# Patient Record
Sex: MALE | Race: WHITE
[De-identification: names, ages, dates, MRNs, and addresses within clinical notes are randomized per-mention and may not be internally consistent; named-entity substitution may affect disease eponyms.]

---

## 2019-01-27 ENCOUNTER — HOSPITAL ENCOUNTER (EMERGENCY)
Dept: HOSPITAL 47 - EC | Age: 29
Discharge: ANOTHER HOSPITAL | End: 2019-01-27
Payer: COMMERCIAL

## 2019-01-27 VITALS — DIASTOLIC BLOOD PRESSURE: 64 MMHG | HEART RATE: 86 BPM | SYSTOLIC BLOOD PRESSURE: 117 MMHG | RESPIRATION RATE: 18 BRPM

## 2019-01-27 VITALS — TEMPERATURE: 97.8 F

## 2019-01-27 DIAGNOSIS — R56.9: Primary | ICD-10-CM

## 2019-01-27 DIAGNOSIS — F17.200: ICD-10-CM

## 2019-01-27 LAB
ALBUMIN SERPL-MCNC: 4.1 G/DL (ref 3.5–5)
ALP SERPL-CCNC: 86 U/L (ref 38–126)
ALT SERPL-CCNC: 31 U/L (ref 21–72)
ANION GAP SERPL CALC-SCNC: 9 MMOL/L
AST SERPL-CCNC: 35 U/L (ref 17–59)
BASOPHILS # BLD AUTO: 0 K/UL (ref 0–0.2)
BASOPHILS NFR BLD AUTO: 0 %
BUN SERPL-SCNC: 17 MG/DL (ref 9–20)
CALCIUM SPEC-MCNC: 9.2 MG/DL (ref 8.4–10.2)
CHLORIDE SERPL-SCNC: 111 MMOL/L (ref 98–107)
CO2 SERPL-SCNC: 19 MMOL/L (ref 22–30)
EOSINOPHIL # BLD AUTO: 0.1 K/UL (ref 0–0.7)
EOSINOPHIL NFR BLD AUTO: 2 %
ERYTHROCYTE [DISTWIDTH] IN BLOOD BY AUTOMATED COUNT: 4.29 M/UL (ref 4.3–5.9)
ERYTHROCYTE [DISTWIDTH] IN BLOOD: 13.1 % (ref 11.5–15.5)
GLUCOSE SERPL-MCNC: 104 MG/DL (ref 74–99)
HCT VFR BLD AUTO: 41.6 % (ref 39–53)
HGB BLD-MCNC: 13.7 GM/DL (ref 13–17.5)
LYMPHOCYTES # SPEC AUTO: 1.8 K/UL (ref 1–4.8)
LYMPHOCYTES NFR SPEC AUTO: 21 %
MCH RBC QN AUTO: 31.9 PG (ref 25–35)
MCHC RBC AUTO-ENTMCNC: 32.9 G/DL (ref 31–37)
MCV RBC AUTO: 97.1 FL (ref 80–100)
MONOCYTES # BLD AUTO: 0.4 K/UL (ref 0–1)
MONOCYTES NFR BLD AUTO: 5 %
NEUTROPHILS # BLD AUTO: 6.1 K/UL (ref 1.3–7.7)
NEUTROPHILS NFR BLD AUTO: 70 %
PH UR: 5.5 [PH] (ref 5–8)
PLATELET # BLD AUTO: 209 K/UL (ref 150–450)
POTASSIUM SERPL-SCNC: 4.7 MMOL/L (ref 3.5–5.1)
PROT SERPL-MCNC: 6.7 G/DL (ref 6.3–8.2)
SODIUM SERPL-SCNC: 139 MMOL/L (ref 137–145)
SP GR UR: 1.02 (ref 1–1.03)
UROBILINOGEN UR QL STRIP: <2 MG/DL (ref ?–2)
WBC # BLD AUTO: 8.7 K/UL (ref 3.8–10.6)

## 2019-01-27 PROCEDURE — 80306 DRUG TEST PRSMV INSTRMNT: CPT

## 2019-01-27 PROCEDURE — 96375 TX/PRO/DX INJ NEW DRUG ADDON: CPT

## 2019-01-27 PROCEDURE — 70450 CT HEAD/BRAIN W/O DYE: CPT

## 2019-01-27 PROCEDURE — 96361 HYDRATE IV INFUSION ADD-ON: CPT

## 2019-01-27 PROCEDURE — 99285 EMERGENCY DEPT VISIT HI MDM: CPT

## 2019-01-27 PROCEDURE — 93005 ELECTROCARDIOGRAM TRACING: CPT

## 2019-01-27 PROCEDURE — 85025 COMPLETE CBC W/AUTO DIFF WBC: CPT

## 2019-01-27 PROCEDURE — 80053 COMPREHEN METABOLIC PANEL: CPT

## 2019-01-27 PROCEDURE — 36415 COLL VENOUS BLD VENIPUNCTURE: CPT

## 2019-01-27 PROCEDURE — 96365 THER/PROPH/DIAG IV INF INIT: CPT

## 2019-01-27 PROCEDURE — 81003 URINALYSIS AUTO W/O SCOPE: CPT

## 2019-01-27 NOTE — CT
EXAMINATION TYPE: CT brain wo con

 

DATE OF EXAM: 1/27/2019

 

COMPARISON: Prior CT brain 6/19/2015

 

HISTORY: Severe headache

 

CT DLP: 1136.4 mGycm.  Automated Exposure Control for Dose Reduction was Utilized.

 

 

TECHNIQUE: CT scan of the head is performed without contrast.

 

FINDINGS:   There is no acute intracranial hemorrhage, mass effect, or midline shift identified.  The
 ventricles and sulci are within normal limits in size.  The globes are intact and the visualized sin
uses are clear. 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

 

IMPRESSION:  No acute intracranial hemorrhage, mass effect, or midline shift is seen.

## 2019-01-27 NOTE — ED
Seizure HPI





- General


Stated Complaint: seizure


Time Seen by Provider: 01/27/19 07:28


Source: patient, EMS, RN notes reviewed


Mode of arrival: EMS


Limitations: no limitations





- History of Present Illness


Initial Comments: 





28-year-old male presents emergency Department via EMS for possible seizure.  

Patient has no history of seizures.  Patient states that he was sleeping and 

was woken up to EMS and police.  Patient girlfriend provides most of 

information stating that she woke up noticed that he was shaking diffusely, 

noted some foaming at his mouth and states that she opened his eyes and they 

were dilated.  Patient denies any use of illicit drugs or alcohol.  Girlfriend 

states that the whole event seemed to last 30 minutes but unclear how long he 

was convulsing for.  No noted injuries patient has no complaints denies headache

, dizziness, blurred vision, focal weakness, mouth injury.





- Related Data


 Home Medications











 Medication  Instructions  Recorded  Confirmed


 


No Known Home Medications  07/15/15 07/15/15











 Allergies











Allergy/AdvReac Type Severity Reaction Status Date / Time


 


No Known Allergies Allergy   Verified 07/15/15 14:41














Review of Systems


ROS Statement: 


Those systems with pertinent positive or pertinent negative responses have been 

documented in the HPI.





ROS Other: All systems not noted in ROS Statement are negative.





Past Medical History


Past Medical History: No Reported History


Additional Past Medical History / Comment(s): back pain


History of Any Multi-Drug Resistant Organisms: None Reported


Past Surgical History: No Surgical Hx Reported


Past Psychological History: No Psychological Hx Reported


Smoking Status: Current every day smoker


Past Alcohol Use History: Occasional


Past Drug Use History: Marijuana





General Exam


General appearance: alert, in no apparent distress


Head exam: Present: atraumatic, normocephalic, normal inspection


Eye exam: Present: normal appearance, PERRL, EOMI.  Absent: scleral icterus, 

conjunctival injection, periorbital swelling


ENT exam: Present: normal exam, normal oropharynx, mucous membranes moist, TM's 

normal bilaterally, normal external ear exam


Neck exam: Present: normal inspection, full ROM.  Absent: tenderness, 

meningismus, lymphadenopathy


Respiratory exam: Present: normal lung sounds bilaterally.  Absent: respiratory 

distress, wheezes, rales, rhonchi, stridor, chest wall tenderness


Cardiovascular Exam: Present: regular rate, normal rhythm, normal heart sounds.

  Absent: systolic murmur, diastolic murmur, rubs, gallop, clicks


GI/Abdominal exam: Present: soft, normal bowel sounds.  Absent: distended, 

tenderness, guarding, rebound, rigid


Extremities exam: Present: normal inspection, full ROM, normal capillary 

refill.  Absent: tenderness, pedal edema, joint swelling, calf tenderness


Neurological exam: Present: alert, oriented X3, CN II-XII intact, reflexes 

normal.  Absent: motor sensory deficit


Skin exam: Present: warm, dry, intact, normal color.  Absent: rash





Course


 Vital Signs











  01/27/19 01/27/19





  07:22 09:05


 


Temperature 97.8 F 


 


Pulse Rate 93 112 H


 


Respiratory 18 22





Rate  


 


Blood Pressure 120/66 163/67


 


O2 Sat by Pulse 97 94 L





Oximetry  














- Reevaluation(s)


Reevaluation #1: 





01/27/19 09:02


I was called the room by family member at this point patient is having tonic-

clonic seizure.  1 mg of Ativan was given, loading dose of Was ordered.





Medical Decision Making





- Medical Decision Making





28-year-old male presented for new-onset seizures.  Patient lab work and CT.  

Patient had another seizure in emergency department was given Ativan and 

Keppra.  Patient CT unremarkable.  Patient will be transferred to [facility] accepting physician Dr. aragon





- Lab Data


Result diagrams: 


 01/27/19 07:44





 01/27/19 07:44


 Lab Results











  01/27/19 01/27/19 Range/Units





  07:44 07:44 


 


WBC  8.7   (3.8-10.6)  k/uL


 


RBC  4.29 L   (4.30-5.90)  m/uL


 


Hgb  13.7   (13.0-17.5)  gm/dL


 


Hct  41.6   (39.0-53.0)  %


 


MCV  97.1   (80.0-100.0)  fL


 


MCH  31.9   (25.0-35.0)  pg


 


MCHC  32.9   (31.0-37.0)  g/dL


 


RDW  13.1   (11.5-15.5)  %


 


Plt Count  209   (150-450)  k/uL


 


Neutrophils %  70   %


 


Lymphocytes %  21   %


 


Monocytes %  5   %


 


Eosinophils %  2   %


 


Basophils %  0   %


 


Neutrophils #  6.1   (1.3-7.7)  k/uL


 


Lymphocytes #  1.8   (1.0-4.8)  k/uL


 


Monocytes #  0.4   (0-1.0)  k/uL


 


Eosinophils #  0.1   (0-0.7)  k/uL


 


Basophils #  0.0   (0-0.2)  k/uL


 


Sodium   139  (137-145)  mmol/L


 


Potassium   4.7  (3.5-5.1)  mmol/L


 


Chloride   111 H  ()  mmol/L


 


Carbon Dioxide   19 L  (22-30)  mmol/L


 


Anion Gap   9  mmol/L


 


BUN   17  (9-20)  mg/dL


 


Creatinine   0.81  (0.66-1.25)  mg/dL


 


Est GFR (CKD-EPI)AfAm   >90  (>60 ml/min/1.73 sqM)  


 


Est GFR (CKD-EPI)NonAf   >90  (>60 ml/min/1.73 sqM)  


 


Glucose   104 H  (74-99)  mg/dL


 


Calcium   9.2  (8.4-10.2)  mg/dL


 


Total Bilirubin   0.4  (0.2-1.3)  mg/dL


 


AST   35  (17-59)  U/L


 


ALT   31  (21-72)  U/L


 


Alkaline Phosphatase   86  ()  U/L


 


Total Protein   6.7  (6.3-8.2)  g/dL


 


Albumin   4.1  (3.5-5.0)  g/dL














Disposition


Clinical Impression: 


 New onset seizure





Disposition: OTHER INSTITUTION NOT DEFINED


Condition: Stable


Referrals: 


None,Stated [Primary Care Provider] - 1-2 days





- Out of Hospital Transfer - Req. Specs


Out of Hospital Transfer - Requested Specifics: Other Emergency Center (

[facility])

## 2019-07-17 ENCOUNTER — HOSPITAL ENCOUNTER (OUTPATIENT)
Dept: HOSPITAL 47 - RADXRMAIN | Age: 29
Discharge: HOME | End: 2019-07-17
Attending: INTERNAL MEDICINE
Payer: COMMERCIAL

## 2019-07-17 ENCOUNTER — HOSPITAL ENCOUNTER (EMERGENCY)
Dept: HOSPITAL 47 - EC | Age: 29
Discharge: HOME | End: 2019-07-17
Payer: COMMERCIAL

## 2019-07-17 VITALS — TEMPERATURE: 98 F | DIASTOLIC BLOOD PRESSURE: 72 MMHG | SYSTOLIC BLOOD PRESSURE: 131 MMHG | HEART RATE: 78 BPM

## 2019-07-17 VITALS — RESPIRATION RATE: 18 BRPM

## 2019-07-17 DIAGNOSIS — M25.531: Primary | ICD-10-CM

## 2019-07-17 DIAGNOSIS — E86.0: Primary | ICD-10-CM

## 2019-07-17 DIAGNOSIS — F17.200: ICD-10-CM

## 2019-07-17 DIAGNOSIS — Z79.899: ICD-10-CM

## 2019-07-17 DIAGNOSIS — G40.909: ICD-10-CM

## 2019-07-17 DIAGNOSIS — R42: ICD-10-CM

## 2019-07-17 DIAGNOSIS — H61.21: ICD-10-CM

## 2019-07-17 LAB
ALBUMIN SERPL-MCNC: 4.6 G/DL (ref 3.5–5)
ALP SERPL-CCNC: 77 U/L (ref 38–126)
ALT SERPL-CCNC: 44 U/L (ref 21–72)
ANION GAP SERPL CALC-SCNC: 9 MMOL/L
AST SERPL-CCNC: 45 U/L (ref 17–59)
BASOPHILS # BLD AUTO: 0 K/UL (ref 0–0.2)
BASOPHILS NFR BLD AUTO: 0 %
BUN SERPL-SCNC: 22 MG/DL (ref 9–20)
CALCIUM SPEC-MCNC: 9.5 MG/DL (ref 8.4–10.2)
CHLORIDE SERPL-SCNC: 103 MMOL/L (ref 98–107)
CO2 SERPL-SCNC: 25 MMOL/L (ref 22–30)
EOSINOPHIL # BLD AUTO: 0.2 K/UL (ref 0–0.7)
EOSINOPHIL NFR BLD AUTO: 2 %
ERYTHROCYTE [DISTWIDTH] IN BLOOD BY AUTOMATED COUNT: 4.32 M/UL (ref 4.3–5.9)
ERYTHROCYTE [DISTWIDTH] IN BLOOD: 13.7 % (ref 11.5–15.5)
GLUCOSE SERPL-MCNC: 91 MG/DL (ref 74–99)
HCT VFR BLD AUTO: 40 % (ref 39–53)
HGB BLD-MCNC: 13.5 GM/DL (ref 13–17.5)
LYMPHOCYTES # SPEC AUTO: 1.9 K/UL (ref 1–4.8)
LYMPHOCYTES NFR SPEC AUTO: 21 %
MAGNESIUM SPEC-SCNC: 2 MG/DL (ref 1.6–2.3)
MCH RBC QN AUTO: 31.3 PG (ref 25–35)
MCHC RBC AUTO-ENTMCNC: 33.7 G/DL (ref 31–37)
MCV RBC AUTO: 92.7 FL (ref 80–100)
MONOCYTES # BLD AUTO: 0.6 K/UL (ref 0–1)
MONOCYTES NFR BLD AUTO: 6 %
NEUTROPHILS # BLD AUTO: 6.2 K/UL (ref 1.3–7.7)
NEUTROPHILS NFR BLD AUTO: 68 %
PH UR: 6 [PH] (ref 5–8)
PLATELET # BLD AUTO: 205 K/UL (ref 150–450)
POTASSIUM SERPL-SCNC: 4.2 MMOL/L (ref 3.5–5.1)
PROT SERPL-MCNC: 7.2 G/DL (ref 6.3–8.2)
PROT UR QL: (no result)
RBC UR QL: <1 /HPF (ref 0–5)
SODIUM SERPL-SCNC: 137 MMOL/L (ref 137–145)
SP GR UR: 1.03 (ref 1–1.03)
SQUAMOUS UR QL AUTO: <1 /HPF (ref 0–4)
UROBILINOGEN UR QL STRIP: 2 MG/DL (ref ?–2)
WBC # BLD AUTO: 9 K/UL (ref 3.8–10.6)
WBC #/AREA URNS HPF: 1 /HPF (ref 0–5)

## 2019-07-17 PROCEDURE — 93005 ELECTROCARDIOGRAM TRACING: CPT

## 2019-07-17 PROCEDURE — 81001 URINALYSIS AUTO W/SCOPE: CPT

## 2019-07-17 PROCEDURE — 80177 DRUG SCRN QUAN LEVETIRACETAM: CPT

## 2019-07-17 PROCEDURE — 80053 COMPREHEN METABOLIC PANEL: CPT

## 2019-07-17 PROCEDURE — 85025 COMPLETE CBC W/AUTO DIFF WBC: CPT

## 2019-07-17 PROCEDURE — 80306 DRUG TEST PRSMV INSTRMNT: CPT

## 2019-07-17 PROCEDURE — 96374 THER/PROPH/DIAG INJ IV PUSH: CPT

## 2019-07-17 PROCEDURE — 99284 EMERGENCY DEPT VISIT MOD MDM: CPT

## 2019-07-17 PROCEDURE — 96361 HYDRATE IV INFUSION ADD-ON: CPT

## 2019-07-17 PROCEDURE — 36415 COLL VENOUS BLD VENIPUNCTURE: CPT

## 2019-07-17 PROCEDURE — 83735 ASSAY OF MAGNESIUM: CPT

## 2019-07-17 NOTE — XR
EXAMINATION TYPE: XR wrist complete RT

 

DATE OF EXAM: 7/17/2019

 

COMPARISON: None

 

HISTORY: Pain

 

TECHNIQUE: 4 view right wrist

 

FINDINGS: No acute fractures are evident. Soft tissues appear normal.

 

There is pain at the anatomic snuff box, nuclear medicine bone scan could be performed for additional
 evaluation. Follow-up exams can be performed 7-10 days from acute trauma for continued pain. There i
s clinical consideration for carpal tunnel, MRI would be recommended.

 

IMPRESSION:

1.  No acute osseous abnormality right wrist

## 2019-07-17 NOTE — ED
General Adult HPI





- General


Chief complaint: Dizziness


Stated complaint: Dizziness


Time Seen by Provider: 07/17/19 14:07


Source: patient, RN notes reviewed


Mode of arrival: wheelchair


Limitations: no limitations





- History of Present Illness


Initial comments: 





29-year-old male with a past medical history of back pain, seizure disorder 

presents to the emergency department for a chief complaint of dizziness.  

Patient states that he woke up today and felt a little lightheaded and like the 

room was spinning.  States he felt like his family was moving back and forth.  

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 

in the factory.  States he did not have a fan for 6 hours while he was there.  

Patient states he has been trying to drink water.  According to triage note 

patient has migraine headache, does admit to this when asked.  States it is very

mild in nature and that he has these on and off.  States he has been taking his 

Keppra as scheduled.  Patient has no other complaints at this time including 

shortness of breath, chest pain, abdominal pain, nausea or vomiting, headache, 

or visual changes.





- Related Data


                                Home Medications











 Medication  Instructions  Recorded  Confirmed


 


levETIRAcetam [Keppra] 1,000 mg PO Q12HR 07/17/19 07/17/19











                                    Allergies











Allergy/AdvReac Type Severity Reaction Status Date / Time


 


No Known Allergies Allergy   Verified 07/17/19 14:11














Review of Systems


ROS Statement: 


Those systems with pertinent positive or pertinent negative responses have been 

documented in the HPI.





ROS Other: All systems not noted in ROS Statement are negative.





Past Medical History


Past Medical History: Seizure Disorder


Additional Past Medical History / Comment(s): back pain


History of Any Multi-Drug Resistant Organisms: None Reported


Past Surgical History: No Surgical Hx Reported


Past Psychological History: No Psychological Hx Reported


Smoking Status: Current every day smoker


Past Alcohol Use History: Occasional


Past Drug Use History: Marijuana





General Exam


Limitations: no limitations


General appearance: alert, in no apparent distress


Head exam: Present: atraumatic, normocephalic, normal inspection


Eye exam: Present: normal appearance, PERRL, EOMI.  Absent: scleral icterus, 

conjunctival injection, periorbital swelling


ENT exam: Present: normal exam, mucous membranes moist


Neck exam: Present: normal inspection, full ROM.  Absent: tenderness, 

meningismus, lymphadenopathy


Respiratory exam: Present: normal lung sounds bilaterally.  Absent: respiratory 

distress, wheezes, rales, rhonchi, stridor


Cardiovascular Exam: Present: regular rate, normal rhythm, normal heart sounds. 

 Absent: systolic murmur, diastolic murmur, rubs, gallop, clicks


Neurological exam: Present: alert, oriented X3, CN II-XII intact, normal gait, 

other (GCS 15)


  ** Expanded


Patient oriented to: Present: person, place, time


Speech: Present: fluid speech


Cranial nerves: EOM's Intact: Normal, Tongue Deviation: Normal, Nystagmus: 

Normal, Facial Sensation: Normal


Cerebellar function: Finger to Nose: Normal


Upper motor neuron: Pronator Drift: Normal


Sensory exam: Upper Extremity Light Touch: Normal, Upper Extremity Pin Prick: 

Normal, Lower Extremity Light Touch: Normal, Lower Extremity Pin Prick: Normal


Motor strength exam: RUE: 5, LUE: 5, RLE: 5, LLE: 5


Eye Response: (4) open spontaneously


Motor Response: (6) obeys commands


Verbal Response: (5) oriented


[place] Total: 15


Psychiatric exam: Present: normal affect, normal mood





Course


                                   Vital Signs











  07/17/19 07/17/19





  14:05 14:56


 


Temperature 98.4 F 


 


Pulse Rate 57 L 60


 


Respiratory 16 18





Rate  


 


Blood Pressure 110/69 111/62


 


O2 Sat by Pulse 98 100





Oximetry  














EKG Findings





- EKG Comments:


EKG Findings:: Sinus bradycardia, ventricular rate 43, LA interval 154, QTc 395





Medical Decision Making





- Medical Decision Making





29-year-old male with a past medical history of back pain, seizure disorder 

presents to the emergency department for a chief complaint of dizziness.  States

 that he will get today and felt a little lightheaded and like the room was 

spinning.  States he felt like the fan in his room was moving back and forth.  

Denies any difficulty walking.  States he did just start a new job on Monday in 

the factory.  He states he has been trying to drink water but does feel 

dehydrated.  Vitals are stable.  Patient does have bradycardia noted on his EKG 

with a heart rate of 43.  Discussed this with him, states he has a history of 

bradycardia and is not taking any medications for this nor has he had any 

difficulty with this.  Blood pressure is stable.  On exam patient does have 

somewhat dry mucous membranes.  No focal neurologic deficits.  Resting 

comfortably, no distress.  No vomiting.  CBC is unremarkable.  CMP does show a 

BUN to creatinine ratio of 25 which is consistent with dehydration, patient 

given a liter of fluids.  Urine is negative.  Patient does have marijuana in his

 toxicology screen.  Patient was given fluids, Antivert and right ear was 

flushed.  States he is feeling significantly better.  Dizziness was likely 

secondary to dehydration and cerumen impaction.  Patient will be discharged home

 with prescription for Antivert and instructions to drink plenty of fluids.  

However recommended close follow-up and return if he has any worsening symptoms.

 Discussed with Dr Edouard.





- Lab Data


Result diagrams: 


                                 07/17/19 14:36





                                 07/17/19 14:36


                                   Lab Results











  07/17/19 07/17/19 07/17/19 Range/Units





  14:36 14:36 14:40 


 


WBC  9.0    (3.8-10.6)  k/uL


 


RBC  4.32    (4.30-5.90)  m/uL


 


Hgb  13.5    (13.0-17.5)  gm/dL


 


Hct  40.0    (39.0-53.0)  %


 


MCV  92.7    (80.0-100.0)  fL


 


MCH  31.3    (25.0-35.0)  pg


 


MCHC  33.7    (31.0-37.0)  g/dL


 


RDW  13.7    (11.5-15.5)  %


 


Plt Count  205    (150-450)  k/uL


 


Neutrophils %  68    %


 


Lymphocytes %  21    %


 


Monocytes %  6    %


 


Eosinophils %  2    %


 


Basophils %  0    %


 


Neutrophils #  6.2    (1.3-7.7)  k/uL


 


Lymphocytes #  1.9    (1.0-4.8)  k/uL


 


Monocytes #  0.6    (0-1.0)  k/uL


 


Eosinophils #  0.2    (0-0.7)  k/uL


 


Basophils #  0.0    (0-0.2)  k/uL


 


Sodium   137   (137-145)  mmol/L


 


Potassium   4.2   (3.5-5.1)  mmol/L


 


Chloride   103   ()  mmol/L


 


Carbon Dioxide   25   (22-30)  mmol/L


 


Anion Gap   9   mmol/L


 


BUN   22 H   (9-20)  mg/dL


 


Creatinine   0.85   (0.66-1.25)  mg/dL


 


Est GFR (CKD-EPI)AfAm   >90   (>60 ml/min/1.73 sqM)  


 


Est GFR (CKD-EPI)NonAf   >90   (>60 ml/min/1.73 sqM)  


 


Glucose   91   (74-99)  mg/dL


 


Calcium   9.5   (8.4-10.2)  mg/dL


 


Magnesium   2.0   (1.6-2.3)  mg/dL


 


Total Bilirubin   0.6   (0.2-1.3)  mg/dL


 


AST   45   (17-59)  U/L


 


ALT   44   (21-72)  U/L


 


Alkaline Phosphatase   77   ()  U/L


 


Total Protein   7.2   (6.3-8.2)  g/dL


 


Albumin   4.6   (3.5-5.0)  g/dL


 


Urine Color    Yellow  


 


Urine Appearance    Clear  (Clear)  


 


Urine pH    6.0  (5.0-8.0)  


 


Ur Specific Gravity    1.035  (1.001-1.035)  


 


Urine Protein    1+ H  (Negative)  


 


Urine Glucose (UA)    Negative  (Negative)  


 


Urine Ketones    Negative  (Negative)  


 


Urine Blood    Negative  (Negative)  


 


Urine Nitrite    Negative  (Negative)  


 


Urine Bilirubin    Negative  (Negative)  


 


Urine Urobilinogen    2.0  (<2.0)  mg/dL


 


Ur Leukocyte Esterase    Negative  (Negative)  


 


Urine RBC    <1  (0-5)  /hpf


 


Urine WBC    1  (0-5)  /hpf


 


Ur Squamous Epith Cells    <1  (0-4)  /hpf


 


Urine Mucus    Moderate H  (None)  /hpf


 


Urine Opiates Screen    Not Detected  (NotDetected)  


 


Ur Oxycodone Screen    Not Detected  (NotDetected)  


 


Urine Methadone Screen    Not Detected  (NotDetected)  


 


Ur Propoxyphene Screen    Not Detected  (NotDetected)  


 


Ur Barbiturates Screen    Not Detected  (NotDetected)  


 


U Tricyclic Antidepress    Not Detected  (NotDetected)  


 


Ur Phencyclidine Scrn    Not Detected  (NotDetected)  


 


Ur Amphetamines Screen    Not Detected  (NotDetected)  


 


U Methamphetamines Scrn    Not Detected  (NotDetected)  


 


U Benzodiazepines Scrn    Not Detected  (NotDetected)  


 


Urine Cocaine Screen    Not Detected  (NotDetected)  


 


U Marijuana (THC) Screen    Detected H  (NotDetected)  














Disposition


Clinical Impression: 


 Dehydration, Dizziness, Impacted cerumen of right ear





Disposition: HOME SELF-CARE


Condition: Good


Instructions (If sedation given, give patient instructions):  Dizziness (ED), 

Dehydration (ED)


Additional Instructions: 


Please drink plenty of fluids and electrolytes while working and in hot 

environments.  Please follow-up with primary care in 1-2 days.  Return here if 

you have any worsening symptoms.


Is patient prescribed a controlled substance at d/c from ED?: No


Referrals: 


[facility] [Primary Care Provider] - 1-2 days


Time of Disposition: 15:52

## 2020-01-23 ENCOUNTER — HOSPITAL ENCOUNTER (EMERGENCY)
Dept: HOSPITAL 47 - EC | Age: 30
Discharge: HOME | End: 2020-01-23
Payer: COMMERCIAL

## 2020-01-23 VITALS — TEMPERATURE: 98.9 F

## 2020-01-23 VITALS — RESPIRATION RATE: 20 BRPM | HEART RATE: 89 BPM | SYSTOLIC BLOOD PRESSURE: 132 MMHG | DIASTOLIC BLOOD PRESSURE: 70 MMHG
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
## 2020-01-23 DIAGNOSIS — Z79.899: ICD-10-CM

## 2020-01-23 DIAGNOSIS — S09.90XA: Primary | ICD-10-CM

## 2020-01-23 DIAGNOSIS — F17.200: ICD-10-CM

## 2020-01-23 DIAGNOSIS — G40.909: ICD-10-CM

## 2020-01-23 PROCEDURE — 99283 EMERGENCY DEPT VISIT LOW MDM: CPT

## 2020-01-23 NOTE — ED
General Adult HPI





- General


Chief complaint: Head Injury


Stated complaint: Head injury


Time Seen by Provider: 01/23/20 18:22


Source: patient, RN notes reviewed, old records reviewed


Mode of arrival: ambulatory


Limitations: no limitations





- History of Present Illness


Initial comments: 


29-year-old male patient presents to ED for chief complaint of head injury.  

Patient reports that he was talking with his girlfriend, he turned around 

rapidly, hitting his frontal skull region on the wall.  Denies any fall.  Denies

loss of consciousness.  Patient reports he has a mild headache and had some very

transient blurred vision which resolved.  Patient reports she is pregnant the 

hospital today for evaluation for work.  Patient has a seizure disorder for 

which she takes Keppra.  Denies any use of blood thinners.  Denies any other 

complaints at this time.





Systemic: Pt denies fatigue, fever/chills, rash. Pt denies weakness, night 

sweats, weight loss. 


Neuro: Pt denies headache, visual disturbances, syncope or pre-syncope.


HEENT: Pt denies ocular discharge or irritation, otalgia, rhinorrhea, 

pharyngitis or notable lymphadenopathy. 


Cardiopulmonary: Pt denies chest pain, SOB, heart palpitations, dyspnea on 

exertion.  


Abdominal/GI: Pt denies abdominal pain, n/v/d. 


: Pt denies dysuria, burning w/ urination, frequency/urgency. Denies new onset

urinary or bowel incontinence.  


MSK: Pt denies myalgia, loss of strength or function in extremities. 


Neuro: Pt denies new onset weakness, paresthesias. 











- Related Data


                                Home Medications











 Medication  Instructions  Recorded  Confirmed


 


levETIRAcetam [Keppra] 1,000 mg PO Q12HR 07/17/19 07/17/19











                                    Allergies











Allergy/AdvReac Type Severity Reaction Status Date / Time


 


No Known Allergies Allergy   Verified 07/17/19 14:11














Review of Systems


ROS Statement: 


Those systems with pertinent positive or pertinent negative responses have been 

documented in the HPI.





ROS Other: All systems not noted in ROS Statement are negative.





Past Medical History


Past Medical History: Seizure Disorder


Additional Past Medical History / Comment(s): back pain


History of Any Multi-Drug Resistant Organisms: None Reported


Past Surgical History: No Surgical Hx Reported


Past Psychological History: No Psychological Hx Reported


Smoking Status: Current every day smoker


Past Alcohol Use History: Occasional


Past Drug Use History: Marijuana





General Exam





- General Exam Comments


Initial Comments: 


Constitutional: NAD, AOX3, Pt has pleasant affect. 


HEENT: NC/AT, trachea midline, neck supple, no lymphadenopathy. Posterior 

pharynx non erythematous, without exudates. External ears appear normal, without

 discharge. Mucous membranes moist. Eyes PERRLA, EOM intact. There is no scleral

 icterus. No pallor noted. 


Cardiopulmonary: RRR, no murmurs, rubs or gallops, no JVD noted. Lungs CTAB in 

anterior and posterior fields. No peripheral edema. 


Abdominal exam: Abdomen soft and non-distended. Abdomen non-tender to palpation 

in all 4 quadrants. Bowel sounds active in LLQ. No hepatosplenomegaly. No 

ecchymosis


Neuro: CN II-XII intact. No nuchal rigidity. No raccon eyes, no pastor sign, no 

hemotympanum. No midline cervical spinal tenderness. 


MSK: No posterior calf tenderness bilaterally, homans sign negative bilaterally.

 Posterior tibialis and radial pulse +2 bilaterally. Sensation intact in upper 

and lower extremities. Full active ROM in upper and lower extremities, 5/5 

stregnth. 





Limitations: no limitations





Course





                                   Vital Signs











  01/23/20





  17:48


 


Temperature 98.9 F


 


Pulse Rate 100


 


Respiratory 19





Rate 


 


Blood Pressure 137/79


 


O2 Sat by Pulse 96





Oximetry 














Medical Decision Making





- Medical Decision Making








29-year-old male patient presents to ED for evaluation of head injury.  Patient 

has rapidly hitting his head on the wall.  No loss of consciousness.  Reports a 

very mild headache at this point.  Patient will tender stable, afebrile.  

Physical exam did not display acute pathology.  Patient discharged, follow-up 

with primary care provider, return to ER if condition worsens. Case discussed 

with Dr. Edouard. 











Disposition


Clinical Impression: 


 Minor head trauma





Disposition: HOME SELF-CARE


Condition: Stable


Instructions (If sedation given, give patient instructions):  General Headache 

(ED)


Additional Instructions: 


Follow-up with primary care provider tomorrow, return to ER if condition worsens

 in any way.


Is patient prescribed a controlled substance at d/c from ED?: No


Referrals: 


[facility] [Primary Care Provider] - 1-2 days

## 2020-01-23 NOTE — ED
Medical Decision Making





- Medical Decision Making


Pt was offered CT and declined due to radiation burden. 








Disposition


Clinical Impression: 


 Minor head trauma





Disposition: HOME SELF-CARE


Condition: Stable


Instructions (If sedation given, give patient instructions):  General Headache 

(ED)


Additional Instructions: 


Follow-up with primary care provider tomorrow, return to ER if condition worsens

in any way.


Is patient prescribed a controlled substance at d/c from ED?: No


Referrals: 


[facility] [Primary Care Provider] - 1-2 days

## 2022-08-18 ENCOUNTER — HOSPITAL ENCOUNTER (OUTPATIENT)
Dept: HOSPITAL 47 - LABWHC1 | Age: 32
Discharge: HOME | End: 2022-08-18
Attending: PSYCHIATRY & NEUROLOGY
Payer: COMMERCIAL

## 2022-08-18 DIAGNOSIS — E55.9: ICD-10-CM

## 2022-08-18 DIAGNOSIS — G43.019: ICD-10-CM

## 2022-08-18 DIAGNOSIS — E53.9: ICD-10-CM

## 2022-08-18 DIAGNOSIS — R07.9: ICD-10-CM

## 2022-08-18 DIAGNOSIS — R42: ICD-10-CM

## 2022-08-18 DIAGNOSIS — I49.9: Primary | ICD-10-CM

## 2022-08-18 DIAGNOSIS — R94.31: ICD-10-CM

## 2022-08-18 LAB
ALBUMIN SERPL-MCNC: 4.6 G/DL (ref 3.8–4.9)
ALBUMIN/GLOB SERPL: 1.77 G/DL (ref 1.6–3.17)
ALP SERPL-CCNC: 91 U/L (ref 41–126)
ALT SERPL-CCNC: 24 U/L (ref 10–49)
ANION GAP SERPL CALC-SCNC: 12.2 MMOL/L (ref 10–18)
AST SERPL-CCNC: 24 U/L (ref 14–35)
BASOPHILS # BLD AUTO: 0.05 X 10*3/UL (ref 0–0.1)
BASOPHILS NFR BLD AUTO: 0.8 %
BUN SERPL-SCNC: 13 MG/DL (ref 9–27)
BUN/CREAT SERPL: 13 RATIO (ref 12–20)
CALCIUM SPEC-MCNC: 9.6 MG/DL (ref 8.7–10.3)
CHLORIDE SERPL-SCNC: 103 MMOL/L (ref 96–109)
CO2 SERPL-SCNC: 22.8 MMOL/L (ref 20–27.5)
EOSINOPHIL # BLD AUTO: 0.07 X 10*3/UL (ref 0.04–0.35)
EOSINOPHIL NFR BLD AUTO: 1.2 %
ERYTHROCYTE [DISTWIDTH] IN BLOOD BY AUTOMATED COUNT: 4.26 X 10*6/UL (ref 4.4–5.6)
ERYTHROCYTE [DISTWIDTH] IN BLOOD: 12.4 % (ref 11.5–14.5)
GLOBULIN SER CALC-MCNC: 2.6 G/DL (ref 1.6–3.3)
GLUCOSE SERPL-MCNC: 88 MG/DL (ref 70–110)
HCT VFR BLD AUTO: 40.8 % (ref 39.6–50)
HGB BLD-MCNC: 13.5 G/DL (ref 13–17)
IMM GRANULOCYTES BLD QL AUTO: 0.3 %
LYMPHOCYTES # SPEC AUTO: 1.82 X 10*3/UL (ref 0.9–5)
LYMPHOCYTES NFR SPEC AUTO: 30.7 %
MCH RBC QN AUTO: 31.7 PG (ref 27–32)
MCHC RBC AUTO-ENTMCNC: 33.1 G/DL (ref 32–37)
MCV RBC AUTO: 95.8 FL (ref 80–97)
MONOCYTES # BLD AUTO: 0.57 X 10*3/UL (ref 0.2–1)
MONOCYTES NFR BLD AUTO: 9.6 %
NEUTROPHILS # BLD AUTO: 3.39 X 10*3/UL (ref 1.8–7.7)
NEUTROPHILS NFR BLD AUTO: 57.4 %
NRBC BLD AUTO-RTO: 0 /100 WBCS (ref 0–0)
PLATELET # BLD AUTO: 205 X 10*3/UL (ref 140–440)
POTASSIUM SERPL-SCNC: 4.4 MMOL/L (ref 3.5–5.5)
PROT SERPL-MCNC: 7.2 G/DL (ref 6.2–8.2)
SODIUM SERPL-SCNC: 138 MMOL/L (ref 135–145)
T4 FREE SERPL-MCNC: 1.3 NG/DL (ref 0.8–1.8)
VIT B12 SERPL-MCNC: 605 PG/ML (ref 200–944)
WBC # BLD AUTO: 5.92 X 10*3/UL (ref 4.5–10)

## 2022-08-18 PROCEDURE — 85025 COMPLETE CBC W/AUTO DIFF WBC: CPT

## 2022-08-18 PROCEDURE — 82306 VITAMIN D 25 HYDROXY: CPT

## 2022-08-18 PROCEDURE — 84443 ASSAY THYROID STIM HORMONE: CPT

## 2022-08-18 PROCEDURE — 82607 VITAMIN B-12: CPT

## 2022-08-18 PROCEDURE — 84481 FREE ASSAY (FT-3): CPT

## 2022-08-18 PROCEDURE — 80053 COMPREHEN METABOLIC PANEL: CPT

## 2022-08-18 PROCEDURE — 84207 ASSAY OF VITAMIN B-6: CPT

## 2022-08-18 PROCEDURE — 93005 ELECTROCARDIOGRAM TRACING: CPT

## 2022-08-18 PROCEDURE — 36415 COLL VENOUS BLD VENIPUNCTURE: CPT

## 2022-08-18 PROCEDURE — 84439 ASSAY OF FREE THYROXINE: CPT

## 2022-10-06 ENCOUNTER — HOSPITAL ENCOUNTER (OUTPATIENT)
Dept: HOSPITAL 47 - LABWHC1 | Age: 32
Discharge: HOME | End: 2022-10-06
Attending: PSYCHIATRY & NEUROLOGY
Payer: COMMERCIAL

## 2022-10-06 DIAGNOSIS — R41.3: ICD-10-CM

## 2022-10-06 DIAGNOSIS — R42: ICD-10-CM

## 2022-10-06 DIAGNOSIS — G40.909: ICD-10-CM

## 2022-10-06 DIAGNOSIS — E53.9: ICD-10-CM

## 2022-10-06 DIAGNOSIS — E55.9: Primary | ICD-10-CM

## 2022-10-06 DIAGNOSIS — R51.9: ICD-10-CM

## 2022-10-06 LAB
ALBUMIN SERPL-MCNC: 4.4 G/DL (ref 3.8–4.9)
ALBUMIN/GLOB SERPL: 2.22 G/DL (ref 1.6–3.17)
ALP SERPL-CCNC: 80 U/L (ref 41–126)
ALT SERPL-CCNC: 17 U/L (ref 10–49)
ANION GAP SERPL CALC-SCNC: 10 MMOL/L (ref 10–18)
AST SERPL-CCNC: 20 U/L (ref 14–35)
BASOPHILS # BLD AUTO: 0.04 X 10*3/UL (ref 0–0.1)
BASOPHILS NFR BLD AUTO: 0.7 %
BUN SERPL-SCNC: 15.6 MG/DL (ref 9–27)
BUN/CREAT SERPL: 15.15 RATIO (ref 12–20)
CALCIUM SPEC-MCNC: 9.2 MG/DL (ref 8.7–10.3)
CHLORIDE SERPL-SCNC: 108 MMOL/L (ref 96–109)
CO2 SERPL-SCNC: 25.3 MMOL/L (ref 20–27.5)
EOSINOPHIL # BLD AUTO: 0.05 X 10*3/UL (ref 0.04–0.35)
EOSINOPHIL NFR BLD AUTO: 0.9 %
ERYTHROCYTE [DISTWIDTH] IN BLOOD BY AUTOMATED COUNT: 4.08 X 10*6/UL (ref 4.4–5.6)
ERYTHROCYTE [DISTWIDTH] IN BLOOD: 12.8 % (ref 11.5–14.5)
GLOBULIN SER CALC-MCNC: 2 G/DL (ref 1.6–3.3)
GLUCOSE SERPL-MCNC: 100 MG/DL (ref 70–110)
HCT VFR BLD AUTO: 39.1 % (ref 39.6–50)
HGB BLD-MCNC: 13.2 G/DL (ref 13–17)
IMM GRANULOCYTES BLD QL AUTO: 0.2 %
LYMPHOCYTES # SPEC AUTO: 1.58 X 10*3/UL (ref 0.9–5)
LYMPHOCYTES NFR SPEC AUTO: 27.5 %
MCH RBC QN AUTO: 32.4 PG (ref 27–32)
MCHC RBC AUTO-ENTMCNC: 33.8 G/DL (ref 32–37)
MCV RBC AUTO: 95.8 FL (ref 80–97)
MONOCYTES # BLD AUTO: 0.49 X 10*3/UL (ref 0.2–1)
MONOCYTES NFR BLD AUTO: 8.5 %
NEUTROPHILS # BLD AUTO: 3.57 X 10*3/UL (ref 1.8–7.7)
NEUTROPHILS NFR BLD AUTO: 62.2 %
NRBC BLD AUTO-RTO: 0 /100 WBCS (ref 0–0)
PLATELET # BLD AUTO: 208 X 10*3/UL (ref 140–440)
POTASSIUM SERPL-SCNC: 4.3 MMOL/L (ref 3.5–5.5)
PROT SERPL-MCNC: 6.4 G/DL (ref 6.2–8.2)
SODIUM SERPL-SCNC: 143 MMOL/L (ref 135–145)
VIT B12 SERPL-MCNC: 564 PG/ML (ref 200–944)
WBC # BLD AUTO: 5.74 X 10*3/UL (ref 4.5–10)

## 2022-10-06 PROCEDURE — 82306 VITAMIN D 25 HYDROXY: CPT

## 2022-10-06 PROCEDURE — 80053 COMPREHEN METABOLIC PANEL: CPT

## 2022-10-06 PROCEDURE — 36415 COLL VENOUS BLD VENIPUNCTURE: CPT

## 2022-10-06 PROCEDURE — 82607 VITAMIN B-12: CPT

## 2022-10-06 PROCEDURE — 84207 ASSAY OF VITAMIN B-6: CPT

## 2022-10-06 PROCEDURE — 85025 COMPLETE CBC W/AUTO DIFF WBC: CPT

## 2023-02-02 ENCOUNTER — HOSPITAL ENCOUNTER (EMERGENCY)
Dept: HOSPITAL 47 - EC | Age: 33
Discharge: HOME | End: 2023-02-02
Payer: COMMERCIAL

## 2023-02-02 VITALS — DIASTOLIC BLOOD PRESSURE: 78 MMHG | SYSTOLIC BLOOD PRESSURE: 123 MMHG | RESPIRATION RATE: 16 BRPM | HEART RATE: 68 BPM

## 2023-02-02 VITALS — TEMPERATURE: 98.9 F

## 2023-02-02 DIAGNOSIS — F17.200: ICD-10-CM

## 2023-02-02 DIAGNOSIS — L02.411: Primary | ICD-10-CM

## 2023-02-02 DIAGNOSIS — F12.90: ICD-10-CM

## 2023-02-02 DIAGNOSIS — F41.9: ICD-10-CM

## 2023-02-02 PROCEDURE — 99283 EMERGENCY DEPT VISIT LOW MDM: CPT

## 2023-02-02 NOTE — ED
Skin/Abscess/FB HPI





- General


Chief complaint: Skin/Abscess/Foreign Body


Stated complaint: Abscess Under Arm


Time Seen by Provider: 02/02/23 19:17


Source: patient, family, RN notes reviewed


Mode of arrival: ambulatory


Limitations: no limitations





- History of Present Illness


Initial comments: 


This is a 32-year-old male who presents to the emergency department for an 

abscess.  States states that he had an abscess appear under his right armpit 

approximately 4 days ago.  He went to urgent care today and had this drained and

then packed.  He was given a shot of Rocephin and started on Bactrim and Keflex.

 Patient states that this was around noon today and he is concerned because he 

has not yet noticed any improvement.  Also states that there is a lump next to 

this, and he is worried that this is another abscess that needs to be drained.  

Denies being in any significant pain at this time.  Also denies any fevers or 

chills.





Denies any fevers, chills, sore throat, cough, dyspnea, chest pain, 

palpitations, abdominal pain, nausea, vomiting, diarrhea, back pain, or 

headaches.





MD complaint: abscess/boil


Location: RUE





- Related Data


                                Home Medications











 Medication  Instructions  Recorded  Confirmed


 


levETIRAcetam [Keppra] 1,000 mg PO Q12HR 07/17/19 02/02/23


 


Butalb/Acetaminophen/Caffeine 1 tab PO DAILY PRN 02/02/23 02/02/23





[Esgic -40 mg Tablet]   


 


Cephalexin [Keflex] 500 mg PO Q6HR 02/02/23 02/02/23


 


HYDROcodone/APAP 7.5-325MG [Norco 1 tab PO BID PRN 02/02/23 02/02/23





7.5-325]   


 


Ibuprofen [Motrin] 600 mg PO Q6HR PRN 02/02/23 02/02/23


 


Meloxicam [Mobic] 15 mg PO DAILY 02/02/23 02/02/23


 


Sulfamethox-Tmp 800-160Mg [Bactrim 1 tab PO BID 02/02/23 02/02/23





-160 mg]   


 


tiZANidine HCL 4 mg PO TID PRN 02/02/23 02/02/23











                                    Allergies











Allergy/AdvReac Type Severity Reaction Status Date / Time


 


No Known Allergies Allergy   Verified 02/02/23 20:14














Review of Systems


ROS Statement: 


Those systems with pertinent positive or pertinent negative responses have been 

documented in the HPI.





ROS Other: All systems not noted in ROS Statement are negative.





Past Medical History


Past Medical History: Seizure Disorder


Additional Past Medical History / Comment(s): back pain


History of Any Multi-Drug Resistant Organisms: None Reported


Past Surgical History: No Surgical Hx Reported


Past Psychological History: Anxiety


Smoking Status: Current every day smoker


Past Alcohol Use History: Occasional


Past Drug Use History: Marijuana





General Exam


Limitations: no limitations


General appearance: alert, in no apparent distress


Head exam: Present: atraumatic, normocephalic, normal inspection


Respiratory exam: Present: normal lung sounds bilaterally.  Absent: respiratory 

distress, wheezes, rales, rhonchi, stridor


Cardiovascular Exam: Present: regular rate, normal rhythm, normal heart sounds. 

Absent: systolic murmur, diastolic murmur, rubs, gallop, clicks


Neurological exam: Present: alert, oriented X3, CN II-XII intact


Psychiatric exam: Present: normal affect, normal mood


Skin exam: Present: other (Abscess to the right axilla is currently packed.  

Adjacent to this under the right upper arm is an area of erythema and 

approximately a 2 cm area of induration.)





Course


                                   Vital Signs











  02/02/23 02/02/23





  19:18 21:06


 


Temperature 98.9 F 


 


Pulse Rate 128 H 68


 


Respiratory 18 16





Rate  


 


Blood Pressure 169/69 123/78


 


O2 Sat by Pulse 99 98





Oximetry  














Medical Decision Making





- Medical Decision Making


This is a 32-year-old male who presents to the emergency department for an 

abscess.





Was pt. sent in by a medical professional or institution?


@  -No   


Did you speak to anyone other than the patient for history?  


@  -His significant other. 


Did you review nursing and triage notes? 


@  -Yes, and I agree, it is accurate with regards to the patient's symptoms.


Were old charts reviewed? 


@  -No


Differential Diagnosis? 


@  -Differential mass:


Abscess, induration, cellulitis, scar tissue, tumor, this is not meant to be an 

all-inclusive list.


U/S interpreted by me (1pt. min.)?


@  -Ultrasound of the mass on the right arm obtained.  My interpretation 

identifies cobblestoning and a soft tissue mass.


What testing was considered but not performed? (CT, X-rays, U/S, labs)? Why?


@  -None


What meds were considered but not given? Why?


@  -None


Did you discuss the management of the patient with other professionals?


@  -No


Did you reconcile home meds?


@  -No


Was smoking cessation discussed for >3mins.?


@  -No


Was critical care preformed (if so, how long)?


@  -No


Were there social determinants of health that impacted care today? How? (Ho

melessness, low income, unemployed, alcoholism, drug addiction, transportation, 

low edu. Level, literacy, decrease access to med. care, [facility], rehab)?


@  -No


Was there de-escalation of care discussed even if they declined? (Discuss DNR or

withdrawal of care, Hospice)?


@  -No


What co-morbidities impacted this encounter? (DM, HTN, Smoking, COPD, CAD, 

Cancer, CVA, Hep., AIDS, mental health diagnosis, sleep apnea, morbid obesity)?


@  -None


Was patient admitted / discharged?


@  -Discharged.  Ultrasound of the additional mass on the right upper extremity 

obtained.  This does reveal complex edematous tissue without evidence of a 

drainable abscess.  Findings reviewed with the patient.  Advised that it may 

take a couple of days for him to notice any significant improvement with the 

antibiotics and he should continue to take them as prescribed.  Advised that if 

he gets to around day 3 and symptoms are worsening or have not improved 

whatsoever, he should return to the emergency department to discuss other 

treatment options.  Recommended he return sooner if he develops fevers/chills or

otherwise feels unwell.  Suggested continuing to apply warm compresses to the 

other lump.  We also discussed that tomorrow or the next day he can try to 

remove the packing.  Advised ibuprofen and Tylenol as needed for pain relief.


Undiagnosed new problem with uncertain prognosis?


@  -None


Drug Therapy requiring intensive monitoring for toxicity (Heparin, Nitro, 

Insulin, Cardizem)?


@  -None


Were any procedures done?


@  -None


Diagnosis/symptom?


@  -Abscess


Acute, or Chronic, or Acute on Chronic?


@  -Acute


Uncomplicated (without systemic symptoms) or Complicated (systemic symptoms)?


@  -Uncomplicated


Side effects of treatment?


@  -None


Exacerbation, Progression, or Severe Exacerbation]


@  -Not applicable


Poses a threat to life or bodily function?


@  -No





Return precautions reviewed in depth, the patient is instructed to return to the

emergency department with any new, worsening, or concerning symptoms. Patient 

verbalized understanding. 





This case was discussed in detail with the attending ED physician, Dr. Khalil. P

resentation, findings, and treatment plan discussed in detail as well. 








- Radiology Data


Radiology results: report reviewed, image reviewed





Disposition


Clinical Impression: 


 Abscess of axilla, right





Disposition: HOME SELF-CARE


Instructions (If sedation given, give patient instructions):  Abscess Incision 

and Drainage (ED), Abscess (ED)


Additional Instructions: 


Return to the emergency department with any new, worsening, or concerning 

symptoms, especially if you develop fevers/chills, or otherwise feel unwell.  

Tomorrow or the next day, remove the packing.  You can continue to apply warm 

compresses to the other firm area on the arm.  Continue to take the Bactrim and 

Keflex as prescribed.  Follow up with your primary care provider in 1-2 days.


Is patient prescribed a controlled substance at d/c from ED?: No


Referrals: 


None,Stated [Primary Care Provider] - 1-2 days

## 2023-02-02 NOTE — US
EXAMINATION TYPE: US extremity nonvasc mass RT

 

DATE OF EXAM: 2/2/2023

 

COMPARISON: NONE

 

CLINICAL HISTORY: Mass under right upper arm. Patient states he had an abscess in his right armpit an
d got it drained today. Now under his right arm in a different spot is red and there is a lump

 

FINDINGS:  

Multiple complex hypoechoic/hyperechoic areas visualized, along with edema, throughout the area of re
dness and lump. The area Doppler perfuses, appearing partially hypervascular. No drainable fluid poly
ection. No sonographic evidence of soft tissue emphysema.

 

 

IMPRESSION:  

Heterogeneous complex soft tissue edematous mass in the area of interest. Recommend follow-up to  res
olution.

## 2023-05-25 ENCOUNTER — HOSPITAL ENCOUNTER (EMERGENCY)
Dept: HOSPITAL 47 - EC | Age: 33
Discharge: HOME | End: 2023-05-25
Payer: COMMERCIAL

## 2023-05-25 VITALS
SYSTOLIC BLOOD PRESSURE: 125 MMHG | DIASTOLIC BLOOD PRESSURE: 80 MMHG | TEMPERATURE: 98.6 F | HEART RATE: 61 BPM | RESPIRATION RATE: 18 BRPM

## 2023-05-25 DIAGNOSIS — M65.4: Primary | ICD-10-CM

## 2023-05-25 DIAGNOSIS — Z79.899: ICD-10-CM

## 2023-05-25 DIAGNOSIS — F12.90: ICD-10-CM

## 2023-05-25 DIAGNOSIS — F41.9: ICD-10-CM

## 2023-05-25 DIAGNOSIS — M65.9: ICD-10-CM

## 2023-05-25 DIAGNOSIS — F17.200: ICD-10-CM

## 2023-05-25 PROCEDURE — 99283 EMERGENCY DEPT VISIT LOW MDM: CPT

## 2023-05-25 PROCEDURE — 29125 APPL SHORT ARM SPLINT STATIC: CPT

## 2023-05-25 NOTE — ED
Extremity Problem HPI





- General


Chief complaint: Extremity Problem,Nontraumatic


Stated complaint: R hand swelling/wrist pain


Time Seen by Provider: 05/25/23 17:22


Source: patient


Mode of arrival: ambulatory


Limitations: no limitations





- History of Present Illness


Initial comments: 


Patient is a 32-year-old male presents to the emergency department for right 

thumb pain.  Patient states he is a  and has performed repetitive 

movements at work while grabbing and gripping objects with his thumb.  Patient 

states today at work his thumb was painful and had tingling in the thumb and up 

his forearm.  He denies injury.  He denies any issues with range of motion.  

Patient states this is happened before but not this bad.  Denies fever, chills, 

nausea, vomiting.








- Related Data


                                Home Medications











 Medication  Instructions  Recorded  Confirmed


 


levETIRAcetam [Keppra] 1,000 mg PO Q12HR 07/17/19 02/02/23


 


Butalb/Acetaminophen/Caffeine 1 tab PO DAILY PRN 02/02/23 02/02/23





[Esgic -40 mg Tablet]   


 


Cephalexin [Keflex] 500 mg PO Q6HR 02/02/23 02/02/23


 


HYDROcodone/APAP 7.5-325MG [Norco 1 tab PO BID PRN 02/02/23 02/02/23





7.5-325]   


 


Ibuprofen [Motrin] 600 mg PO Q6HR PRN 02/02/23 02/02/23


 


Meloxicam [Mobic] 15 mg PO DAILY 02/02/23 02/02/23


 


Sulfamethox-Tmp 800-160Mg [Bactrim 1 tab PO BID 02/02/23 02/02/23





-160 mg]   


 


tiZANidine HCL 4 mg PO TID PRN 02/02/23 02/02/23








                                  Previous Rx's











 Medication  Instructions  Recorded


 


Ibuprofen [Motrin] 800 mg PO Q8HR #30 tab 05/25/23











                                    Allergies











Allergy/AdvReac Type Severity Reaction Status Date / Time


 


No Known Allergies Allergy   Verified 05/25/23 17:16














Review of Systems


ROS Statement: 


Those systems with pertinent positive or pertinent negative responses have been 

documented in the HPI.





ROS Other: All systems not noted in ROS Statement are negative.





Past Medical History


Past Medical History: Seizure Disorder


Additional Past Medical History / Comment(s): back pain


History of Any Multi-Drug Resistant Organisms: None Reported


Past Surgical History: No Surgical Hx Reported


Past Psychological History: Anxiety


Smoking Status: Current every day smoker


Past Alcohol Use History: Occasional


Past Drug Use History: Marijuana





General Exam


Limitations: no limitations


Head exam: Present: atraumatic, normocephalic, normal inspection


Eye exam: Present: normal appearance, PERRL, EOMI.  Absent: scleral icterus, 

conjunctival injection, periorbital swelling


Respiratory exam: Present: normal lung sounds bilaterally.  Absent: respiratory 

distress, wheezes, rales, rhonchi, stridor


Cardiovascular Exam: Present: regular rate, normal rhythm, normal heart sounds. 

 Absent: systolic murmur, diastolic murmur, rubs, gallop, clicks


Extremities exam: Present: other (Pain with palpation to palmar aspect of right 

thumb.  Positive Finkelstein. Cap refill < 2 seconds.  Sensation intact.  Full 

range of motion)


Neurological exam: Present: alert, oriented X3, CN II-XII intact


Psychiatric exam: Present: normal affect, normal mood


Skin exam: Present: warm, dry, intact, normal color.  Absent: rash





Course


                                   Vital Signs











  05/25/23





  17:12


 


Temperature 98.6 F


 


Pulse Rate 61


 


Respiratory 18





Rate 


 


Blood Pressure 125/80


 


O2 Sat by Pulse 98





Oximetry 














Procedures





- Orthopedic Splinting/Casting


  ** Injury #1


Upper Extremity Immobilizer: volar splint (with immobilization of thumb)





Medical Decision Making





- Medical Decision Making


Was pt. sent in by a medical professional or institution (, PA, NP, urgent 

care, hospital, or nursing home...) When possible be specific


@  -No


Did you speak to anyone other than the patient for history (EMS, parent, family,

 police, friend...)? What history was obtained from this source 


@  -No


Did you review nursing and triage notes (agree or disagree)?  Why? 


@  -I reviewed and agree with nursing and triage notes


Were old charts reviewed (outside hosp., previous admission, EMS record, old 

EKG, old radiological studies, urgent care reports/EKG's, nursing home records)?

 Report findings 


@  -No old charts were reviewed


Differential Diagnosis (chest pain, altered mental status, abdominal pain women,

 abdominal pain men, vaginal bleeding, weakness, fever, dyspnea, syncope, 

headache, dizziness, GI bleed, back pain, seizure, CVA, palpatations, mental 

health)? 


@  -Thumb fracture, thumb sprain, cellulitis, de quervain tenosynovitis.  This 

list is not meant to be all-inclusive 


EKG interpreted by me (3pts min.).


@  -As above


X-rays interpreted by me (1pt min.).


@  -None done


CT interpreted by me (1pt min.).


@  -None done


U/S interpreted by me (1pt. min.).


@  -None done


What testing was considered but not performed or refused? (CT, X-rays, U/S, 

labs)? Why?


@  -Considered x-ray however patient did not sustain injury 


What meds were considered but not given or refused? Why?


@  -None


Did you discuss the management of the patient with other professionals 

(professionals i.e. , PA, NP, lab, RT, psych nurse, , , 

teacher, , )? Give summary


@  -No


Was smoking cessation discussed for >3mins.?


@  -No


Was critical care preformed (if so, how long)?


@  -No


Were there social determinants of health that impacted care today? How? 

(Homelessness, low income, unemployed, alcoholism, drug addiction, 

transportation, low edu. Level, literacy, decrease access to med. care, snf, 

rehab)?


@  -No


Was there de-escalation of care discussed even if they declined (Discuss DNR or 

withdrawal of care, Hospice)? DNR status


@  -No


What co-morbidities impacted this encounter? (DM, HTN, Smoking, COPD, CAD, 

Cancer, CVA, ARF, Chemo, Hep., AIDS, mental health diagnosis, sleep apnea, 

morbid obesity)?


@  -None


Was patient admitted / discharged? Hospital course, mention meds given and 

route, prescriptions, significant lab abnormalities, going to OR and other 

pertinent info.


@  -Discharge.  Clinical presentation consistent with de quervain tenosynovitis.

  Patient placed in splint with immobilization of the thumb.  He was given 

Motrin prescription and will follow up with orthopedic specialist. 


Undiagnosed new problem with uncertain prognosis?


@  -No


Drug Therapy requiring intensive monitoring for toxicity (Heparin, Nitro, 

Insulin, Cardizem)?


@  -No


Were any procedures done?


@  -yes, splinting


Diagnosis/symptom?


@  -de quervain tenosynovitis


Acute, or Chronic, or Acute on Chronic?


@  acute


Uncomplicated (without systemic symptoms) or Complicated (systemic symptoms)?


@  -uncomplicated 


Side effects of treatment?


@  -No


Exacerbation, Progression, or Severe Exacerbation?


@  -No


Poses a threat to life or bodily function? How? (Chest pain, USA, MI, pneumonia,

 PE, COPD, DKA, ARF, appy, cholecystitis, CVA, Diverticulitis, Homicidal, 

Suicidal, threat to staff... and all critical care pts)


@  -No





Dr. Edouard is my attending 

















Disposition


Clinical Impression: 


 De Quervain's disease (tenosynovitis)





Disposition: HOME SELF-CARE


Condition: Good


Instructions (If sedation given, give patient instructions):  De Quervain 

Disease (ED)


Additional Instructions: 


Take medication as directed.  Avoid repeated motions of the thumb and wrist that

 contribute to pain.  Follow-up with orthopedic specialist in 1-2 days.  Return 

to the emergency department if you experience new, concerning, or worsening 

symptoms.


Prescriptions: 


Ibuprofen [Motrin] 800 mg PO Q8HR #30 tab


Is patient prescribed a controlled substance at d/c from ED?: No


Referrals: 


None,Stated [Primary Care Provider] - 1-2 days


Johnnie Bai MD [Medical Doctor] - 1-2 days

## 2023-09-01 ENCOUNTER — HOSPITAL ENCOUNTER (EMERGENCY)
Dept: HOSPITAL 47 - EC | Age: 33
Discharge: HOME | End: 2023-09-01
Payer: COMMERCIAL

## 2023-09-01 VITALS — RESPIRATION RATE: 16 BRPM | DIASTOLIC BLOOD PRESSURE: 61 MMHG | HEART RATE: 47 BPM | SYSTOLIC BLOOD PRESSURE: 119 MMHG

## 2023-09-01 VITALS — TEMPERATURE: 99.1 F

## 2023-09-01 DIAGNOSIS — F12.90: ICD-10-CM

## 2023-09-01 DIAGNOSIS — R25.2: Primary | ICD-10-CM

## 2023-09-01 DIAGNOSIS — Z86.59: ICD-10-CM

## 2023-09-01 DIAGNOSIS — F17.200: ICD-10-CM

## 2023-09-01 PROCEDURE — 99283 EMERGENCY DEPT VISIT LOW MDM: CPT

## 2023-09-01 PROCEDURE — 93971 EXTREMITY STUDY: CPT

## 2023-09-01 PROCEDURE — 96372 THER/PROPH/DIAG INJ SC/IM: CPT

## 2023-09-01 NOTE — US
EXAMINATION TYPE: US venous doppler duplex LE LT

 

DATE OF EXAM: 9/1/2023 9:10 PM

 

COMPARISON: NONE

 

CLINICAL INDICATION: Male, 33 years old with history of tenderness, redness; ankle and shin pain for 
no reason, no h/o dvt

 

SIDE PERFORMED: Left  

 

TECHNIQUE:  The lower extremity deep venous system is examined utilizing real time linear array sonog
rowdy with graded compression, doppler sonography and color-flow sonography.

 

VESSELS IMAGED:

Common Femoral Vein

Deep Femoral Vein

Greater Saphenous Vein *

Femoral Vein

Popliteal Vein

Small Saphenous Vein *

Proximal Calf Veins

(* superficial vessels)

 

Grayscale, color doppler, spectral doppler imaging performed of the deep veins of the left lower extr
emity.  There is normal flow, compressibility, vascular waveforms.

 

Left Leg:  Negative for DVT

 

 

IMPRESSION:  

No deep venous thrombosis of the left lower extremity.

## 2023-09-01 NOTE — ED
Extremity Problem HPI





- General


Chief complaint: Extremity Problem,Nontraumatic


Stated complaint: left leg injury


Time Seen by Provider: 09/01/23 20:42


Source: patient


Mode of arrival: ambulatory


Limitations: no limitations





- History of Present Illness


Initial comments: 


33-year-old male presenting with chief complaint of left lower leg pain.  Pain 

is been ongoing for about 3-4 days.  States that he has pain with weightbearing.

 Feels a cramping sensation to the shin and calf.  No known injury.  No history 

of DVT.  No recent surgery or travel.  No chest pain or difficulty breathing.  

Patient has full range of motion.  There is a small area of redness that does 

not appear cellulitic.  No red streaking.  No fevers or chills.  No nausea or 

vomiting.  Patient states that he is in pain management for his back and has 

history of chronic pain








- Related Data


                                Home Medications











 Medication  Instructions  Recorded  Confirmed


 


levETIRAcetam [Keppra] 1,000 mg PO Q12HR 07/17/19 02/02/23


 


Butalb/Acetaminophen/Caffeine 1 tab PO DAILY PRN 02/02/23 02/02/23





[Esgic -40 mg Tablet]   


 


Cephalexin [Keflex] 500 mg PO Q6HR 02/02/23 02/02/23


 


HYDROcodone/APAP 7.5-325MG [Norco 1 tab PO BID PRN 02/02/23 02/02/23





7.5-325]   


 


Ibuprofen [Motrin] 600 mg PO Q6HR PRN 02/02/23 02/02/23


 


Meloxicam [Mobic] 15 mg PO DAILY 02/02/23 02/02/23


 


Sulfamethox-Tmp 800-160Mg [Bactrim 1 tab PO BID 02/02/23 02/02/23





-160 mg]   


 


tiZANidine HCL 4 mg PO TID PRN 02/02/23 02/02/23








                                  Previous Rx's











 Medication  Instructions  Recorded


 


Ibuprofen [Motrin] 800 mg PO Q8HR #30 tab 05/25/23


 


Cyclobenzaprine [Flexeril] 10 mg PO TID PRN #15 tab 09/01/23











                                    Allergies











Allergy/AdvReac Type Severity Reaction Status Date / Time


 


No Known Allergies Allergy   Verified 09/01/23 20:35














Review of Systems


ROS Statement: 


Those systems with pertinent positive or pertinent negative responses have been 

documented in the HPI.





ROS Other: All systems not noted in ROS Statement are negative.





Past Medical History


Past Medical History: Seizure Disorder


Additional Past Medical History / Comment(s): back pain


History of Any Multi-Drug Resistant Organisms: None Reported


Past Surgical History: No Surgical Hx Reported


Past Psychological History: Anxiety


Smoking Status: Current every day smoker


Past Alcohol Use History: Occasional


Past Drug Use History: Marijuana





General Exam


Limitations: no limitations


General appearance: alert, in no apparent distress


Head exam: Present: atraumatic, normocephalic, normal inspection


Eye exam: Present: normal appearance, EOMI


Neck exam: Present: normal inspection, full ROM


Respiratory exam: Present: normal lung sounds bilaterally.  Absent: respiratory 

distress, wheezes, rales, rhonchi, stridor


Cardiovascular Exam: Present: regular rate, normal rhythm, normal heart sounds. 

 Absent: systolic murmur, diastolic murmur, rubs, gallop, clicks


  ** Left


Lower Leg exam: Present: normal inspection, full ROM, tenderness.  Absent: 

swelling


Neurovascular tendon exam: Present: no vascular compromise


Neurological exam: Present: alert, oriented X3, CN II-XII intact


Psychiatric exam: Present: normal affect, normal mood


Skin exam: Present: warm, dry, intact, other (Small patch of faint redness to 

the left lower leg, does not appear cellulitic).  Absent: rash





Course


                                   Vital Signs











  09/01/23 09/01/23





  20:32 22:26


 


Temperature 99.1 F 


 


Pulse Rate 58 L 47 L


 


Respiratory 20 16





Rate  


 


Blood Pressure 126/70 119/61


 


O2 Sat by Pulse 98 97





Oximetry  














Medical Decision Making





- Medical Decision Making


Was pt. sent in by a medical professional or institution (, PA, NP, urgent 

care, hospital, or nursing home...) When possible be specific


@  -No


Did you speak to anyone other than the patient for history (EMS, parent, family,

 police, friend...)? What history was obtained from this source 


@  -No


Did you review nursing and triage notes (agree or disagree)?  Why? 


@  -I reviewed and agree with nursing and triage notes


Were old charts reviewed (outside hosp., previous admission, EMS record, old 

EKG, old radiological studies, urgent care reports/EKG's, nursing home records)?

 Report findings 


@  -No old charts were reviewed


Differential Diagnosis (chest pain, altered mental status, abdominal pain women,

 abdominal pain men, vaginal bleeding, weakness, fever, dyspnea, syncope, 

headache, dizziness, GI bleed, back pain, seizure, CVA, palpatations, mental 

health, musculoskeletal)? 


@  -Differential Musculoskeletal


Muscular strain, contusion, ligament sprain, fracture, arthritis, septic 

arthritis, bursitis, cellulitis, muscle spasm, nerve compression, DVT, arterial 

occlusion, herpes zoster, electrolyte abnormality, tumor.... This is not meant 

to be in all inclusive list


EKG interpreted by me (3pts min.).


@  -As above


X-rays interpreted by me (1pt min.).


@  -None done


CT interpreted by me (1pt min.).


@  -None done


U/S interpreted by me (1pt. min.).


@  -Left leg negative for DVT


What testing was considered but not performed or refused? (CT, X-rays, U/S, 

labs)? Why?


@  -None


What meds were considered but not given or refused? Why?


@  -None


Did you discuss the management of the patient with other professionals 

(professionals i.e. , PA, NP, lab, RT, psych nurse, , , 

teacher, , )? Give summary


@  -No


Was smoking cessation discussed for >3mins.?


@  -No


Was critical care preformed (if so, how long)?


@  -No


Were there social determinants of health that impacted care today? How? 

(Homelessness, low income, unemployed, alcoholism, drug addiction, 

transportation, low edu. Level, literacy, decrease access to med. care, [facility], 

rehab)?


@  -No


Was there de-escalation of care discussed even if they declined (Discuss DNR or 

withdrawal of care, Hospice)? DNR status


@  -No


What co-morbidities impacted this encounter? (DM, HTN, Smoking, COPD, CAD, 

Cancer, CVA, ARF, Chemo, Hep., AIDS, mental health diagnosis, sleep apnea, 

morbid obesity)?


@  -None


Was patient admitted / discharged? Hospital course, mention meds given and 

route, prescriptions, significant lab abnormalities, going to OR and other 

pertinent info.


@  -33-year-old male presenting with chief complaint of left lower leg pain with

 weightbearing.  No injury or trauma.  No swelling.  There is a small patch of 

faint erythema, no red streaking.  The area is not warm to the touch.  It does 

not appear to be cellulitic.  No fevers.  No chest pain or difficulty breathing.

  Ultrasound negative for DVT.  Patient is given Toradol and Norflex.  On 

reassessment he reports some improvement.  He is instructed to follow-up with 

his PCP and pain management.  He is provided with orthopedic referral if pain 

does not subside.  He is requesting a work note.  Follow-up with PCP.  Report 

back to ER with any new or worsening symptoms.  Discussed return parameters and 

answered all questions.  Patient conveyed verbal understanding and agreed to the

 plan.  I discussed this case in detail with my attending Dr. Edouard


Undiagnosed new problem with uncertain prognosis?


@  -No


Drug Therapy requiring intensive monitoring for toxicity (Heparin, Nitro, 

Insulin, Cardizem)?


@  -No


Were any procedures done?


@  -No


Diagnosis/symptom?


@  -Leg cramping


Acute, or Chronic, or Acute on Chronic?


@  -acute


Uncomplicated (without systemic symptoms) or Complicated (systemic symptoms)?


@  -Uncomplicated


Side effects of treatment?


@  -No


Exacerbation, Progression, or Severe Exacerbation?


@  -No


Poses a threat to life or bodily function? How? (Chest pain, USA, MI, pneumonia,

 PE, COPD, DKA, ARF, appy, cholecystitis, CVA, Diverticulitis, Homicidal, Blanca

cidal, threat to staff... and all critical care pts)


@  -No








Disposition


Clinical Impression: 


 Leg cramping





Disposition: HOME SELF-CARE


Condition: Good


Instructions (If sedation given, give patient instructions):  Leg Cramps (ED)


Additional Instructions: 


Follow-up with PCP in pain management.  Report back to ER with any new or 

worsening symptoms.  Take medication as prescribed, do not take cyclobenzaprine 

before driving or operating heavy machinery as it may cause drowsiness


Prescriptions: 


Cyclobenzaprine [Flexeril] 10 mg PO TID PRN #15 tab


 PRN Reason: Spasms


Is patient prescribed a controlled substance at d/c from ED?: No


Referrals: 


None,Stated [Primary Care Provider] - 1-2 days


Rayo Cheng DO [Doctor of Osteopathic Medicine] - 1-2 days


Time of Disposition: 22:14

## 2024-10-15 ENCOUNTER — HOSPITAL ENCOUNTER (EMERGENCY)
Dept: HOSPITAL 47 - EC | Age: 34
Discharge: HOME | End: 2024-10-15
Payer: COMMERCIAL

## 2024-10-15 VITALS — DIASTOLIC BLOOD PRESSURE: 67 MMHG | SYSTOLIC BLOOD PRESSURE: 110 MMHG

## 2024-10-15 VITALS — TEMPERATURE: 97.5 F | RESPIRATION RATE: 16 BRPM

## 2024-10-15 VITALS — HEART RATE: 53 BPM

## 2024-10-15 LAB
ALBUMIN SERPL-MCNC: 4.6 G/DL (ref 3.5–5)
ALP SERPL-CCNC: 74 U/L (ref 38–126)
ALT SERPL-CCNC: 28 U/L (ref 4–49)
ANION GAP SERPL CALC-SCNC: 18 MMOL/L
AST SERPL-CCNC: 32 U/L (ref 17–59)
BASOPHILS # BLD AUTO: 0 K/UL (ref 0–0.2)
BASOPHILS NFR BLD AUTO: 0 %
BUN SERPL-SCNC: 14 MG/DL (ref 9–20)
CALCIUM SPEC-MCNC: 9.5 MG/DL (ref 8.4–10.2)
CHLORIDE SERPL-SCNC: 109 MMOL/L (ref 98–107)
CO2 SERPL-SCNC: 12 MMOL/L (ref 22–30)
EOSINOPHIL # BLD AUTO: 0.1 K/UL (ref 0–0.7)
EOSINOPHIL NFR BLD AUTO: 1 %
ERYTHROCYTE [DISTWIDTH] IN BLOOD BY AUTOMATED COUNT: 4.72 M/UL (ref 4.3–5.9)
ERYTHROCYTE [DISTWIDTH] IN BLOOD: 12.9 % (ref 11.5–15.5)
GLUCOSE SERPL-MCNC: 124 MG/DL (ref 74–99)
HCT VFR BLD AUTO: 47.5 % (ref 39–53)
HGB BLD-MCNC: 15.7 GM/DL (ref 13–17.5)
LYMPHOCYTES # SPEC AUTO: 1.6 K/UL (ref 1–4.8)
LYMPHOCYTES NFR SPEC AUTO: 11 %
MAGNESIUM SPEC-SCNC: 2.1 MG/DL (ref 1.6–2.3)
MCH RBC QN AUTO: 33.3 PG (ref 25–35)
MCHC RBC AUTO-ENTMCNC: 33.2 G/DL (ref 31–37)
MCV RBC AUTO: 100.4 FL (ref 80–100)
MONOCYTES # BLD AUTO: 0.6 K/UL (ref 0–1)
MONOCYTES NFR BLD AUTO: 4 %
NEUTROPHILS # BLD AUTO: 11.3 K/UL (ref 1.3–7.7)
NEUTROPHILS NFR BLD AUTO: 82 %
PLATELET # BLD AUTO: 204 K/UL (ref 150–450)
POTASSIUM SERPL-SCNC: 4.3 MMOL/L (ref 3.5–5.1)
PROT SERPL-MCNC: 6.9 G/DL (ref 6.3–8.2)
SODIUM SERPL-SCNC: 139 MMOL/L (ref 137–145)
WBC # BLD AUTO: 13.7 K/UL (ref 3.8–10.6)

## 2024-10-15 PROCEDURE — 93005 ELECTROCARDIOGRAM TRACING: CPT

## 2024-10-15 PROCEDURE — 80053 COMPREHEN METABOLIC PANEL: CPT

## 2024-10-15 PROCEDURE — 96361 HYDRATE IV INFUSION ADD-ON: CPT

## 2024-10-15 PROCEDURE — 83735 ASSAY OF MAGNESIUM: CPT

## 2024-10-15 PROCEDURE — 85025 COMPLETE CBC W/AUTO DIFF WBC: CPT

## 2024-10-15 PROCEDURE — 96375 TX/PRO/DX INJ NEW DRUG ADDON: CPT

## 2024-10-15 PROCEDURE — 36415 COLL VENOUS BLD VENIPUNCTURE: CPT

## 2024-10-15 PROCEDURE — 96374 THER/PROPH/DIAG INJ IV PUSH: CPT

## 2024-10-15 PROCEDURE — 70450 CT HEAD/BRAIN W/O DYE: CPT

## 2024-10-15 PROCEDURE — 99285 EMERGENCY DEPT VISIT HI MDM: CPT

## 2024-10-15 RX ADMIN — CEFAZOLIN STA MLS/HR: 330 INJECTION, POWDER, FOR SOLUTION INTRAMUSCULAR; INTRAVENOUS at 10:47

## 2024-10-15 RX ADMIN — LEVETIRACETAM STA MG: 100 INJECTION, SOLUTION, CONCENTRATE INTRAVENOUS at 10:48

## 2024-10-15 RX ADMIN — ACETAMINOPHEN STA MLS/HR: 10 INJECTION, SOLUTION INTRAVENOUS at 11:52

## 2024-10-15 RX ADMIN — KETOROLAC TROMETHAMINE STA MG: 15 INJECTION, SOLUTION INTRAMUSCULAR; INTRAVENOUS at 11:40

## 2024-10-15 NOTE — ED
Seizure HPI





- General


Chief Complaint: Seizure


Stated Complaint: Seizure


Time Seen by Provider: 10/15/24 10:27


Source: patient, EMS, RN notes reviewed


Mode of arrival: EMS


Limitations: no limitations





- History of Present Illness


Initial Comments: 


34-year-old male presents emergency department chief complaint of a seizure.  

Patient presented via EMS he had a witnessed seizure by friend in the room.  

Patient does have noted facial abrasion, hematoma left periorbital patient 

states this happens when he does not take his Keppra he did not take his Keppra 

yesterday he states he takes 500 once daily.  He states typically is controlled 

except when he misses doses this happens.  Patient did have a seizure earlier 

today also.  Denies any extremity pain no neck pain no back pain denies any 

other complaints other than mild headache.








- Related Data


                                Home Medications











 Medication  Instructions  Recorded  Confirmed


 


levETIRAcetam [Keppra] 1,000 mg PO Q12HR 07/17/19 02/02/23


 


Butalb/Acetaminophen/Caffeine 1 tab PO DAILY PRN 02/02/23 02/02/23





[Esgic -40 mg Tablet]   


 


Cephalexin [Keflex] 500 mg PO Q6HR 02/02/23 02/02/23


 


HYDROcodone/APAP 7.5-325MG [Norco 1 tab PO BID PRN 02/02/23 02/02/23





7.5-325]   


 


Ibuprofen [Motrin] 600 mg PO Q6HR PRN 02/02/23 02/02/23


 


Meloxicam [Mobic] 15 mg PO DAILY 02/02/23 02/02/23


 


Sulfamethox-Tmp 800-160Mg [Bactrim 1 tab PO BID 02/02/23 02/02/23





-160 mg]   


 


tiZANidine HCL 4 mg PO TID PRN 02/02/23 02/02/23








                                  Previous Rx's











 Medication  Instructions  Recorded


 


Ibuprofen [Motrin] 800 mg PO Q8HR #30 tab 05/25/23


 


Cyclobenzaprine [Flexeril] 10 mg PO TID PRN #15 tab 09/01/23


 


levETIRAcetam [Keppra] 500 mg PO Q12HR #60 tab 10/15/24











                                    Allergies











Allergy/AdvReac Type Severity Reaction Status Date / Time


 


No Known Allergies Allergy   Verified 10/15/24 10:20














Review of Systems


ROS Statement: 


Those systems with pertinent positive or pertinent negative responses have been 

documented in the HPI.





ROS Other: All systems not noted in ROS Statement are negative.





Past Medical History


Past Medical History: Seizure Disorder


Additional Past Medical History / Comment(s): back pain


History of Any Multi-Drug Resistant Organisms: None Reported


Past Surgical History: No Surgical Hx Reported


Past Psychological History: Anxiety


Smoking Status: Current every day smoker


Past Alcohol Use History: None Reported, Occasional


Past Drug Use History: Marijuana





General Exam


Limitations: no limitations


General appearance: alert, in no apparent distress


Head exam: Present: atraumatic, normocephalic, normal inspection


Eye exam: Present: normal appearance, PERRL, EOMI.  Absent: scleral icterus, 

conjunctival injection, periorbital swelling


ENT exam: Present: normal exam, normal oropharynx, mucous membranes moist


Neck exam: Present: normal inspection, full ROM.  Absent: tenderness, 

meningismus, lymphadenopathy


Respiratory exam: Present: normal lung sounds bilaterally.  Absent: respiratory 

distress, wheezes, rales, rhonchi, stridor


Cardiovascular Exam: Present: regular rate, normal rhythm, normal heart sounds. 

 Absent: systolic murmur, diastolic murmur, rubs, gallop, clicks


GI/Abdominal exam: Present: soft, normal bowel sounds.  Absent: distended, 

tenderness, guarding, rebound, rigid


Neurological exam: Present: alert, oriented X3


Skin exam: Present: warm, dry, intact, normal color.  Absent: rash





Course


                                   Vital Signs











  10/15/24 10/15/24





  10:18 11:15


 


Temperature 97.5 F L 


 


Pulse Rate 54 L 48 L


 


Respiratory 16 16





Rate  


 


Blood Pressure 128/77 120/65


 


O2 Sat by Pulse 98 99





Oximetry  














Medical Decision Making





- Medical Decision Making


Was pt. sent in by a medical professional or institution (, PA, NP, urgent 

care, hospital, or nursing home...) When possible be specific


@ -No


Did you speak to anyone other than the patient for history (EMS, parent, family,

 police, friend...)? What history was obtained from this source 


@ -No


Did you review nursing and triage notes (agree or disagree)? Why? 


@ -I reviewed and agree with nursing and triage notes


Were old charts reviewed (outside hosp., previous admission, EMS record, old 

EKG, old radiological studies, urgent care reports/EKG's, nursing home records)?

 Report findings 


@ -No old charts were reviewed


Differential Diagnosis (chest pain, altered mental status, abdominal pain women,

 abdominal pain men, vaginal bleeding, weakness, fever, dyspnea, syncope, 

headache, dizziness, GI bleed, back pain, seizure, CVA, palpatations, mental 

health, musculoskeletal)? 


@ -Differential Seizure:


Recurrent seizure disorder, febrile seizure, alcohol withdrawal, stimulants, 

meningitis, encephalitis, intercranial hemorrhage, intracranial tumor, stroke, 

eclampsia, thyrotoxicosis, hypocalcemia, hyponatremia, hypernatremia, 

hypomagnesemia, psychogenic, this is not meant to be an all-inclusive list. 





EKG interpreted by me (3pts min.).


@ -As above


X-rays interpreted by me (1pt min.).


@ -None done


CT interpreted by me (1pt min.).


@ -CT brain shows no acute intracranial hemorrhage mass effect or obvious 

fracture


U/S interpreted by me (1pt. min.).


@ -None done


What testing was considered but not performed or refused? (CT, X-rays, U/S, 

labs)? Why?


@ -None


What meds were considered but not given or refused? Why?


@ -None


Did you discuss the management of the patient with other professionals 

(professionals i.e. , PA, NP, lab, RT, psych nurse, , , 

teacher, , )? Give summary


@ -No


Was smoking cessation discussed for >3mins.?


@ -No


Was critical care preformed (if so, how long)?


@ -No


Were there social determinants of health that impacted care today? How? 

(Homelessness, low income, unemployed, alcoholism, drug addiction, 

transportation, low edu. Level, literacy, decrease access to med. care, shelter, 

rehab)?


@ -No


Was there de-escalation of care discussed even if they declined (Discuss DNR or 

withdrawal of care, Hospice)? DNR status


@ -No


What co-morbidities impacted this encounter? (DM, HTN, Smoking, COPD, CAD, 

Cancer, CVA, ARF, Chemo, Hep., AIDS, mental health diagnosis, sleep apnea, 

morbid obesity)?


@ -Seizure history


Was patient admitted / discharged? Hospital course, mention meds given and 

route, prescriptions, significant lab abnormalities, going to OR and other 

pertinent info.


@ -Discharge patient is awake alert and orientated has no specific complaints at

 this time.  Patient did have some facial abrasions after a seizure CT was 

negative patient is medication noncompliant.  Patient will given prescription 

for his Keppra


Undiagnosed new problem with uncertain prognosis?


@ -No


Drug Therapy requiring intensive monitoring for toxicity (Heparin, Nitro, 

Insulin, Cardizem)?


@ -No


Were any procedures done?


@ -No


Diagnosis/symptom?


@ -Recurrent seizures


Acute, or Chronic, or Acute on Chronic?


@ -Acute


Uncomplicated (without systemic symptoms) or Complicated (systemic symptoms)?


@ -Complicated


Side effects of treatment?


@ -No


Exacerbation, Progression, or Severe Exacerbation?


@ -No


Poses a threat to life or bodily function? How? (Chest pain, USA, MI, pneumonia,

 PE, COPD, DKA, ARF, appy, cholecystitis, CVA, Diverticulitis, Homicidal, 

Suicidal, threat to staff... and all critical care pts)


@ -No








- Lab Data


Result diagrams: 


                                 10/15/24 10:42





                                 10/15/24 10:42


                                   Lab Results











  10/15/24 10/15/24 Range/Units





  10:42 10:42 


 


WBC  13.7 H   (3.8-10.6)  k/uL


 


RBC  4.72   (4.30-5.90)  m/uL


 


Hgb  15.7   (13.0-17.5)  gm/dL


 


Hct  47.5   (39.0-53.0)  %


 


MCV  100.4 H   (80.0-100.0)  fL


 


MCH  33.3   (25.0-35.0)  pg


 


MCHC  33.2   (31.0-37.0)  g/dL


 


RDW  12.9   (11.5-15.5)  %


 


Plt Count  204   (150-450)  k/uL


 


MPV  9.8   


 


Neutrophils %  82   %


 


Lymphocytes %  11   %


 


Monocytes %  4   %


 


Eosinophils %  1   %


 


Basophils %  0   %


 


Neutrophils #  11.3 H   (1.3-7.7)  k/uL


 


Lymphocytes #  1.6   (1.0-4.8)  k/uL


 


Monocytes #  0.6   (0-1.0)  k/uL


 


Eosinophils #  0.1   (0-0.7)  k/uL


 


Basophils #  0.0   (0-0.2)  k/uL


 


Sodium   139  (137-145)  mmol/L


 


Potassium   4.3  (3.5-5.1)  mmol/L


 


Chloride   109 H  ()  mmol/L


 


Carbon Dioxide   12 L  (22-30)  mmol/L


 


Anion Gap   18  mmol/L


 


BUN   14  (9-20)  mg/dL


 


Creatinine   0.89  (0.66-1.25)  mg/dL


 


Est GFR (CKD-EPI)AfAm   >90  (>60 ml/min/1.73 sqM)  


 


Est GFR (CKD-EPI)NonAf   >90  (>60 ml/min/1.73 sqM)  


 


Glucose   124 H  (74-99)  mg/dL


 


Calcium   9.5  (8.4-10.2)  mg/dL


 


Magnesium   2.1  (1.6-2.3)  mg/dL


 


Total Bilirubin   0.5  (0.2-1.3)  mg/dL


 


AST   32  (17-59)  U/L


 


ALT   28  (4-49)  U/L


 


Alkaline Phosphatase   74  ()  U/L


 


Total Protein   6.9  (6.3-8.2)  g/dL


 


Albumin   4.6  (3.5-5.0)  g/dL














Disposition


Clinical Impression: 


 Generalized seizure





Disposition: HOME SELF-CARE


Condition: Stable


Instructions (If sedation given, give patient instructions):  Seizure/Epilepsy 

Discharge Instructions & Follow-Up, Recurrent Seizures in Adults (ED)


Additional Instructions: 


Please return to the Emergency Department if symptoms worsen or any other 

concerns.


Prescriptions: 


levETIRAcetam [Keppra] 500 mg PO Q12HR #60 tab


Is patient prescribed a controlled substance at d/c from ED?: No


Referrals: 


None,Stated [Primary Care Provider] - 1-2 days


Time of Disposition: 12:56

## 2024-10-15 NOTE — CT
EXAMINATION TYPE: CT brain wo con

CT DLP: 1067.4 mGycm, Automated exposure control for dose reduction was used.

 

DATE OF EXAM: 10/15/2024 11:09 AM

 

COMPARISON: Prior CT Brain from 1/27/2019 .

 

CLINICAL INDICATION:Male, 34 years old with history of seizure activity, Seizure activity

 

TECHNIQUE: 

Brain: Multiple axial CT images of the brain were obtained without IV contrast. . Coronal and sagitta
l reformats reviewed.

 

FINDINGS:

 

Brain:

Extra-axial spaces: No abnormal extra-axial fluid collections.

Ventricular system: Within normal limits

Cerebral parenchyma: No acute intraparenchymal hemorrhage or mass effect.  The gray-white junction is
 well differentiated. 

Cerebellum: Unremarkable.

Mass effect: No evidence of midline shift.

Intracranial vasculature: unremarkable

Soft tissues: Normal.

Calvarium/osseous structures: No depressed skull fracture.

Paranasal sinuses and mastoid air cells: Clear

Visualized orbits: Orbital contents are intact.

Other: Incidental tongue piercing.

 

IMPRESSION:

No acute intracranial process.

 

 

[facility], Workstation: [id number], 10/15/2024 11:12 AM